# Patient Record
Sex: FEMALE | Race: WHITE | NOT HISPANIC OR LATINO | Employment: FULL TIME | URBAN - METROPOLITAN AREA
[De-identification: names, ages, dates, MRNs, and addresses within clinical notes are randomized per-mention and may not be internally consistent; named-entity substitution may affect disease eponyms.]

---

## 2019-03-19 DIAGNOSIS — K58.9 IRRITABLE BOWEL SYNDROME, UNSPECIFIED TYPE: Primary | ICD-10-CM

## 2019-03-19 DIAGNOSIS — K21.9 GASTROESOPHAGEAL REFLUX DISEASE, ESOPHAGITIS PRESENCE NOT SPECIFIED: ICD-10-CM

## 2019-03-19 RX ORDER — DICYCLOMINE HYDROCHLORIDE 10 MG/1
10 CAPSULE ORAL 4 TIMES DAILY PRN
Qty: 90 CAPSULE | Refills: 3 | Status: SHIPPED | OUTPATIENT
Start: 2019-03-19

## 2019-03-19 RX ORDER — RANITIDINE 150 MG/1
150 CAPSULE ORAL
Qty: 90 CAPSULE | Refills: 3 | Status: SHIPPED | OUTPATIENT
Start: 2019-03-19

## 2019-03-19 RX ORDER — PANTOPRAZOLE SODIUM 40 MG/1
40 TABLET, DELAYED RELEASE ORAL DAILY
Qty: 90 TABLET | Refills: 3 | Status: SHIPPED | OUTPATIENT
Start: 2019-03-19

## 2019-03-19 NOTE — TELEPHONE ENCOUNTER
Patient last visit January 2019  Prescriptions were only written for 30 day supply, patient requests 90 day for insurance/lower copy option  Please sign off   Thanks

## 2019-03-19 NOTE — TELEPHONE ENCOUNTER
Pt left  mssg stating her given name is Vera Skaggs/goes by [facility]; asks for refill Bentyl to [facility] pharmacy; would like 90 day if possible; takes as needed 10 mg; also would like Pantoprazole and Ranitidine sent to [place] [id number]  for 90 days as well   No need to CB from her stand point unless you have questions [phone]

## 2019-11-15 ENCOUNTER — OFFICE VISIT (OUTPATIENT)
Dept: GASTROENTEROLOGY | Facility: CLINIC | Age: 59
End: 2019-11-15
Payer: COMMERCIAL

## 2019-11-15 ENCOUNTER — TELEPHONE (OUTPATIENT)
Dept: GASTROENTEROLOGY | Facility: CLINIC | Age: 59
End: 2019-11-15

## 2019-11-15 VITALS
BODY MASS INDEX: 36.32 KG/M2 | DIASTOLIC BLOOD PRESSURE: 84 MMHG | SYSTOLIC BLOOD PRESSURE: 122 MMHG | WEIGHT: 205 LBS | HEART RATE: 80 BPM | HEIGHT: 63 IN

## 2019-11-15 DIAGNOSIS — R11.0 NAUSEA: ICD-10-CM

## 2019-11-15 DIAGNOSIS — R10.10 UPPER ABDOMINAL PAIN: Primary | ICD-10-CM

## 2019-11-15 DIAGNOSIS — Z12.11 SCREENING FOR COLON CANCER: ICD-10-CM

## 2019-11-15 DIAGNOSIS — K21.9 GASTROESOPHAGEAL REFLUX DISEASE, ESOPHAGITIS PRESENCE NOT SPECIFIED: ICD-10-CM

## 2019-11-15 PROCEDURE — 99204 OFFICE O/P NEW MOD 45 MIN: CPT | Performed by: NURSE PRACTITIONER

## 2019-11-15 NOTE — PROGRESS NOTES
[id number] - Outpatient Consultation  B Harsh Reyes 64 y o  female MRN: [id number]  Encounter: [id number]    ASSESSMENT AND PLAN:      1  Upper abdominal pain  Upper abdominal pain over the past week  She has had episodes of epigastric discomfort in the past, but believes this feels different  Last upper endoscopy performed November 2018 was negative for any abnormalities with the exception of mild nonerosive gastritis  Possibilities include stress gastritis, peptic ulcer disease, choledocholithiasis or pancreatitis  Prior history of H pylori gastritis noted on upper endoscopy performed in 2009  She was treated with eradication therapy at that time   Recent upper endoscopy without any recurrence of H pylori organisms  Possible postinfectious IBS  She does have a longstanding history of diarrhea predominant IBS but reports the pain does not feel similar to her IBS episodes  Additionally, she has not been experiencing any diarrhea and her stools have been more regular over the past few months     - increase Protonix to 40 milligrams twice a day  - discontinue Zantac  - CBC and differential; Future  - Comprehensive metabolic panel; Future  - Amylase; Future  - Lipase; Future  - if symptoms persist can arrange for an upper endoscopy    2  Nausea  And early satiety over the past week  Exclude postinfectious gastroparesis or peptic ulcer disease     - if symptoms do not improve would order a gastric emptying study    3  Screening for colon cancer  Last colonoscopy performed November 2009 was a negative examination for adenomatous colon polyps  A recall colonoscopy advised for 10 years  - will arrange a colonoscopy after upper GI issues have been addressed    4  Gastroesophageal reflux disease, esophagitis presence not specified  Longstanding issues with GERD  Symptoms have slightly increased over the past week  Has noticed more belching      Avoid spicy, fried, fatty and acidic type foods  Discontinue Ranitidine  Increase Protonix to 40 milligrams twice a day  Will check blood work to include a CBC, CMP, amylase and lipase  If no improvement would repeat an upper endoscopy      Followup Appointment:  4 weeks  ______________________________________________________________________    Chief Complaint   Patient presents with    Heartburn    Nausea       HPI:   Robbie Jacques is a 64y o  year old female who presents with worsening heartburn in addition to a new onset of nausea and upper abdominal pain over the past week  Symptoms began last week when she also was experiencing fevers and headaches  Reported low-grade fevers that resolved  Headaches also have improved  The nausea and epigastric abdominal pain has remain  She typically has pain in her left upper quadrant that can radiate to her left back  However, also notes right upper quadrant abdominal discomfort  Food seems to provoke but not necessarily always  Associated with bloating and increased belching  Some early satiety  Denies any weight loss, dysphagia, odynophagia, diarrhea, melena or rectal bleeding  Bowel habits are regular  Historical Information   Past Medical History:   Diagnosis Date    GERD (gastroesophageal reflux disease)     IBS (irritable bowel syndrome)     Lactose intolerance      Past Surgical History:   Procedure Laterality Date    COLONOSCOPY  11/30/2009    Negative except internal hemorrhoids  A recall colonoscopy advised in 10 years    UPPER GASTROINTESTINAL ENDOSCOPY  11/2018    Mild gastritis  No Cruz's esophagus or H pylori      UPPER GASTROINTESTINAL ENDOSCOPY  11/2009    H pylori gastritis     Social History     Substance and Sexual Activity   Alcohol Use Not Currently     Social History     Substance and Sexual Activity   Drug Use Never     Social History     Tobacco Use   Smoking Status Never Smoker   Smokeless Tobacco Never Used     Family History   Problem Relation Age of Onset    Addiction problem Father     Heart disease Father     Colon cancer Maternal Grandfather        Meds/Allergies     Current Outpatient Medications:     dicyclomine (BENTYL) 10 mg capsule    pantoprazole (PROTONIX) 40 mg tablet    ranitidine (ZANTAC) 150 MG capsule    Allergies   Allergen Reactions    Sulfa Antibiotics        PHYSICAL EXAM:    Blood pressure 122/84, pulse 80, height 5' 3" (1 6 m), weight 93 kg (205 lb)  Body mass index is 36 31 kg/m²  General Appearance: NAD, cooperative, alert  Eyes: Anicteric, PERRLA, EOMI  ENT:  Normocephalic, atraumatic, normal mucosa  Neck:  Supple, symmetrical, trachea midline,   Resp:  Clear to auscultation bilaterally; no rales, rhonchi or wheezing; respirations unlabored   CV:  S1 S2, Regular rate and rhythm; no murmur, rub, or gallop  GI:  Soft, mild left upper quadrant abdominal tenderness, no rebound or guarding, non-distended; normal bowel sounds; no masses, no organomegaly   Rectal: Deferred  Musculoskeletal: No cyanosis, clubbing or edema  Normal ROM  Skin:  No jaundice, rashes, or lesions   Heme/Lymph: No palpable cervical lymphadenopathy  Psych: Normal affect, good eye contact  Neuro: No gross deficits, AAOx3    Lab Results:   No results found for: WBC, HGB, HCT, MCV, PLT  No results found for: NA, K, CL, CO2, ANIONGAP, BUN, CREATININE, GLUCOSE, GLUF, CALCIUM, CORRECTEDCA, AST, ALT, ALKPHOS, PROT, BILITOT, EGFR  No results found for: IRON, TIBC, FERRITIN  No results found for: LIPASE    Radiology Results:   No results found  REVIEW OF SYSTEMS:    CONSTITUTIONAL: Denies any fever, chills, rigors, and weight loss  Positive for fatigue and fever  HEENT: No earache or tinnitus  Denies hearing loss or visual disturbances  CARDIOVASCULAR: No chest pain or palpitations  RESPIRATORY: Denies any hemoptysis, shortness of breath or dyspnea on exertion  Positive for cough  GASTROINTESTINAL: As noted in the History of Present Illness     GENITOURINARY: No problems with urination  Denies any hematuria or dysuria  NEUROLOGIC: No dizziness or vertigo, denies headaches  MUSCULOSKELETAL:  Admits to muscle aches and joint pain  SKIN: Denies skin rashes or itching  ENDOCRINE: Denies excessive thirst  Denies intolerance to heat or cold  PSYCHOSOCIAL: Denies depression or anxiety  Denies any recent memory loss

## 2019-11-15 NOTE — PATIENT INSTRUCTIONS
Avoid spicy, fried, fatty and acidic type foods  Discontinue ranitidine  Increase Protonix to 40 milligrams twice a day  Will check blood work to include CBC, CMP, amylase and lipase  If no improvement would repeat an upper endoscopy

## 2019-11-18 ENCOUNTER — TELEPHONE (OUTPATIENT)
Dept: GASTROENTEROLOGY | Facility: CLINIC | Age: 59
End: 2019-11-18

## 2019-11-18 DIAGNOSIS — R79.89 ELEVATED LFTS: Primary | ICD-10-CM

## 2019-11-18 NOTE — TELEPHONE ENCOUNTER
Pt states she saw Kt Bradley Friday and is to spk to her today about how she's feeling and review labs   # [phone]

## 2019-11-18 NOTE — TELEPHONE ENCOUNTER
Left message on answering machine that results of stool studies have not been resulted yet but that blood work essentially looked okay with the exception of mild elevation LFTs  please try to find stool study results, thank you  I told her to call me tomorrow

## 2019-11-19 ENCOUNTER — TELEPHONE (OUTPATIENT)
Dept: GASTROENTEROLOGY | Facility: CLINIC | Age: 59
End: 2019-11-19

## 2019-11-19 NOTE — TELEPHONE ENCOUNTER
Results of blood work given, ALT and alk-phos elevated and continues with abdominal pain, nausea vomiting  Concern with the common bile duct stone  Status post laparoscopic cholecystectomy  Can we please get an order for MRCP stat to rule out common bile duct stone  She goes to [facility]    Also can you please send blood work for hep B, hep C, autoimmune panel and iron panel to [facility], thank you

## 2019-11-19 NOTE — TELEPHONE ENCOUNTER
Called [facility] - they have no appts for MRCP today or tomorrow  Patient notified and she will call and see if she can do Friday morning before she leaves  Order for MRCP/labs faxed to [facility] OP Registration    Routing to Lacie Chaney as Ramiro Karimi

## 2019-11-20 LAB — HCV AB SER-ACNC: NON REACTIVE

## 2019-11-25 NOTE — TELEPHONE ENCOUNTER
Pt asks to spk w/ Bibi to f/u on testing from last wk; had MRI Fri and knows it was read; asks for [facility] [phone]

## 2019-11-25 NOTE — TELEPHONE ENCOUNTER
Spoke with pt and MRI results given to the patient  I also had  Dr Davis Rather review  MRI results with radiology  They did not feel anything significant noted on MRI  Awaiting comprehensive liver serologies  She has an appointment to see Jeannette Ward in January of next year  The patient was advised to call our office if symptoms do not improve or worsen

## 2021-08-24 NOTE — TELEPHONE ENCOUNTER
I returned call to patient, she states she had a fever last Friday to Monday which has dissipated  She still continues with epigastric pain, nausea, bloating  She states she has remained stable on current medication regimen until this episode  Patient was placed on schedule for appointment 11/15/19 
Pt called to schedule with Dr Jovita Narvaez, she cant be seen till January  Pt has been feeling very poor that pasted few days  Sx fever nausea headache and bloating   She takes 20mg Protonix an the am and Zantac 150 pm  She would like to speak with a nurse to discuss
Pt left [facility] she missed a call    [phone]
Ambulatory Dysfunction